# Patient Record
Sex: MALE | Race: WHITE
[De-identification: names, ages, dates, MRNs, and addresses within clinical notes are randomized per-mention and may not be internally consistent; named-entity substitution may affect disease eponyms.]

---

## 2018-08-16 ENCOUNTER — HOSPITAL ENCOUNTER (OUTPATIENT)
Dept: HOSPITAL 92 - BICRAD | Age: 68
Discharge: HOME | End: 2018-08-16
Attending: INTERNAL MEDICINE
Payer: MEDICARE

## 2018-08-16 DIAGNOSIS — D47.2: Primary | ICD-10-CM

## 2018-08-16 PROCEDURE — 77075 RADEX OSSEOUS SURVEY COMPL: CPT

## 2019-03-07 ENCOUNTER — HOSPITAL ENCOUNTER (OUTPATIENT)
Dept: HOSPITAL 92 - BICRAD | Age: 69
Discharge: HOME | End: 2019-03-07
Attending: INTERNAL MEDICINE
Payer: MEDICARE

## 2019-03-07 DIAGNOSIS — D47.2: Primary | ICD-10-CM

## 2019-03-07 PROCEDURE — 84165 PROTEIN E-PHORESIS SERUM: CPT

## 2019-03-07 PROCEDURE — 80053 COMPREHEN METABOLIC PANEL: CPT

## 2019-03-07 PROCEDURE — 82232 ASSAY OF BETA-2 PROTEIN: CPT

## 2019-03-07 PROCEDURE — 77075 RADEX OSSEOUS SURVEY COMPL: CPT

## 2019-03-07 PROCEDURE — 83883 ASSAY NEPHELOMETRY NOT SPEC: CPT

## 2019-03-07 PROCEDURE — 36415 COLL VENOUS BLD VENIPUNCTURE: CPT

## 2019-03-07 NOTE — RAD
SKELETAL SURVEY:

 

HISTORY: 

Monoclonal gammopathy.

 

FINDINGS: 

Lateral view chest, AP and lateral views cervical spine, AP and lateral views thoracic spine, AP and 
lateral views lumbar spine, AP view of upper and lower extremities excluding the hand and feet.

 

Skeletal survey demonstrates no definite evidence of large lytic lesions.  The skull radiograph has a
 normal appearance for a patient of this age.  Degenerative changes with changes of spondylosis are s
een at C4-5 and C5-6.  The rest of the cervical, thoracic, lumbar spine, pelvis, and upper and lower 
extremities demonstrate no definite evidence of masses or lesions.

 

POS: [facility]

## 2020-10-08 ENCOUNTER — HOSPITAL ENCOUNTER (OUTPATIENT)
Dept: HOSPITAL 92 - BICRAD | Age: 70
Discharge: HOME | End: 2020-10-08
Attending: INTERNAL MEDICINE
Payer: MEDICARE

## 2020-10-08 DIAGNOSIS — C90.00: Primary | ICD-10-CM

## 2020-10-08 DIAGNOSIS — M25.551: ICD-10-CM

## 2020-10-08 NOTE — RAD
XR Hip Rt 2-3 View



INDICATION: History of myeloma and right hip pain



COMPARISON: Whole body bone survey dated May 27, 2020



FINDINGS:



Bones: No large osteolytic lesion is grossly evident. No acute fracture is demonstrated.



Hip joint: There is mild osteoarthrosis of the right hip.



SI joints and symphysis pubis: Radiographically normal.



Intrapelvic contents: Small phleboliths are seen within the lower right hemipelvis.



Surrounding soft tissues: Radiographically normal.



IMPRESSION:

1. No acute osseous abnormality.



Reported By: Silvano Hood 

Electronically Signed:  10/8/2020 11:27 AM

## 2024-12-16 ENCOUNTER — HOSPITAL ENCOUNTER (OUTPATIENT)
Dept: HOSPITAL 92 - PET | Age: 74
Discharge: HOME | End: 2024-12-16
Attending: INTERNAL MEDICINE
Payer: MEDICARE

## 2024-12-16 DIAGNOSIS — R94.8: ICD-10-CM

## 2024-12-16 DIAGNOSIS — C90.00: Primary | ICD-10-CM

## 2024-12-16 DIAGNOSIS — C79.51: ICD-10-CM

## 2024-12-16 DIAGNOSIS — Z87.39: ICD-10-CM

## 2024-12-16 DIAGNOSIS — G62.0: ICD-10-CM

## 2024-12-16 DIAGNOSIS — M89.9: ICD-10-CM

## 2024-12-16 DIAGNOSIS — N18.30: ICD-10-CM

## 2024-12-16 PROCEDURE — 78815 PET IMAGE W/CT SKULL-THIGH: CPT

## 2024-12-16 PROCEDURE — A9552 F18 FDG: HCPCS

## 2025-01-20 ENCOUNTER — HOSPITAL ENCOUNTER (OUTPATIENT)
Dept: HOSPITAL 92 - BICMAMMO | Age: 75
Discharge: HOME | End: 2025-01-20
Attending: INTERNAL MEDICINE
Payer: MEDICARE

## 2025-01-20 DIAGNOSIS — G62.0: ICD-10-CM

## 2025-01-20 DIAGNOSIS — M85.88: ICD-10-CM

## 2025-01-20 DIAGNOSIS — N18.30: Primary | ICD-10-CM

## 2025-01-20 DIAGNOSIS — C79.51: ICD-10-CM

## 2025-01-20 DIAGNOSIS — C90.00: ICD-10-CM

## 2025-01-20 PROCEDURE — 77080 DXA BONE DENSITY AXIAL: CPT
